# Patient Record
Sex: FEMALE | Race: ASIAN | NOT HISPANIC OR LATINO | URBAN - METROPOLITAN AREA
[De-identification: names, ages, dates, MRNs, and addresses within clinical notes are randomized per-mention and may not be internally consistent; named-entity substitution may affect disease eponyms.]

---

## 2021-08-11 ENCOUNTER — EMERGENCY (EMERGENCY)
Facility: HOSPITAL | Age: 41
LOS: 1 days | Discharge: HOME | End: 2021-08-11
Attending: EMERGENCY MEDICINE | Admitting: EMERGENCY MEDICINE
Payer: OTHER GOVERNMENT

## 2021-08-11 VITALS
SYSTOLIC BLOOD PRESSURE: 102 MMHG | OXYGEN SATURATION: 97 % | HEART RATE: 70 BPM | DIASTOLIC BLOOD PRESSURE: 69 MMHG | RESPIRATION RATE: 18 BRPM | TEMPERATURE: 98 F

## 2021-08-11 DIAGNOSIS — R51.9 HEADACHE, UNSPECIFIED: ICD-10-CM

## 2021-08-11 PROCEDURE — 99284 EMERGENCY DEPT VISIT MOD MDM: CPT

## 2021-08-11 PROCEDURE — 93010 ELECTROCARDIOGRAM REPORT: CPT

## 2021-08-11 PROCEDURE — 93005 ELECTROCARDIOGRAM TRACING: CPT

## 2021-08-11 PROCEDURE — 96374 THER/PROPH/DIAG INJ IV PUSH: CPT

## 2021-08-11 PROCEDURE — 99284 EMERGENCY DEPT VISIT MOD MDM: CPT | Mod: 25

## 2021-08-11 RX ORDER — METOCLOPRAMIDE HCL 10 MG
10 TABLET ORAL ONCE
Refills: 0 | Status: COMPLETED | OUTPATIENT
Start: 2021-08-11 | End: 2021-08-11

## 2021-08-11 RX ORDER — SODIUM CHLORIDE 9 MG/ML
1000 INJECTION INTRAMUSCULAR; INTRAVENOUS; SUBCUTANEOUS ONCE
Refills: 0 | Status: COMPLETED | OUTPATIENT
Start: 2021-08-11 | End: 2021-08-11

## 2021-08-11 RX ADMIN — SODIUM CHLORIDE 1000 MILLILITER(S): 9 INJECTION INTRAMUSCULAR; INTRAVENOUS; SUBCUTANEOUS at 02:41

## 2021-08-11 RX ADMIN — Medication 104 MILLIGRAM(S): at 02:41

## 2021-08-11 NOTE — ED PROVIDER NOTE - CARE PROVIDERS DIRECT ADDRESSES
[email or web address]

## 2021-08-11 NOTE — ED PROVIDER NOTE - PROGRESS NOTE DETAILS
Headache resolved.  Requesting DC home.  Moved to [facility] this year, will give info so patient can establish primary care and neurology follow up. Return precautions given.  Stable for DC.

## 2021-08-11 NOTE — ED PROVIDER NOTE - OBJECTIVE STATEMENT
42 y/o F pt with PMHx of migraines presents to ED c/o of headache that began around 7PM tonight. It began as a mild headache located to the R parietal that she tried to take a nap for, hoping it would feel better, but it continued to slowly worsen over the next several hours. She took Tylenol 650mg at around 10PM, but vomited it up 15min later. Her headache slightly improved since coming to ED, and she now rates it a 4 or 5 out of 10. Pt's past headache were usually global throughout her head and not unilateral, and she used to see a migraine specialist for them. Her last severe migraine was about 2yrs ago, and she reports her trigger for past migraines was usually when a darwin day became rainy later in the day, which is what happened today. Pt here tonight because she had an episode of feeling shaky all over with tingling in both her hands and feet for several minutes. Over the past several months, she's also been having brief episodes of chest pressure every few days, but only when learning forward; her last episode was 3-4 days ago. She denies fever, chills, URI symptoms, stiff neck, chest pain, palpitations, or shortness of breath.    No known hx of CAD, HTN, HLD, or DM.  Never smoker, no cocaine or other drug use.   No FHx of prematuer CAD or sudden death.   Her daughter was born with VSD. 42 y/o F pt with PMHx of migraines presents to ED c/o of headache that began around 7PM tonight. It began as a mild headache located to the R parietal that she tried to take a nap for, hoping it would feel better, but it continued to slowly worsen over the next several hours. She took Tylenol 650mg at around 10PM, but vomited it up 15min later. Her headache slightly improved since coming to ED, and she now rates it a 4 or 5 out of 10. Pt's past headache were usually global throughout her head and not unilateral, and she used to see a migraine specialist for them. Her last severe headache was about 2yrs ago, and she reports her trigger for past migraines was usually when a darwin day became rainy later in the day, which is what happened today. Pt here tonight because she had an episode of feeling shaky all over with tingling in both her hands and feet for several minutes. Over the past several months, she's also been having brief episodes of chest pressure every few days, but only when learning forward; her last episode was 3-4 days ago. Says the pain felt like it was "on the surface" of her chest.  She denies fever, chills, URI symptoms, stiff neck, chest pain, palpitations, cough, hemoptysis, or shortness of breath.    No known hx of CAD, HTN, HLD, or DM.  Never smoker, no cocaine or other drug use.   No FHx of prematuer CAD or sudden death.   Her daughter was born with VSD. 40 y/o F pt with PMHx of migraines presents to ED c/o of headache that began around 7PM tonight. It began as a mild headache located to the R parietal that she tried to take a nap for, hoping it would feel better, but it continued to slowly worsen over the next several hours. She took Tylenol 650mg at around 10PM, but vomited it up 15min later. Her headache slightly improved since coming to ED, and she now rates it a 4 or 5 out of 10. Pt's past headache were usually global throughout her head and not unilateral, and she used to see a migraine specialist for them. Her last severe headache was about 2yrs ago, and she reports her trigger for past migraines was usually when a darwin day became rainy later in the day, which is what happened today.  She believes she had neuroimaging for the headaches when she lived in [place].  Pt here tonight because she had an episode of feeling shaky all over with tingling in both her hands and feet for several minutes. Over the past several months, she's also been having brief episodes of chest pressure every few days, but only when learning forward; her last episode was 3-4 days ago. Says the pain felt like it was "on the surface" of her chest.  She denies fever, chills, URI symptoms, stiff neck, chest pain, palpitations, cough, hemoptysis, or shortness of breath.    No known hx of CAD, HTN, HLD, or DM.  Never smoker, no cocaine or other drug use.   No FHx of prematuer CAD or sudden death.   Her daughter was born with VSD. 40 y/o F pt with PMHx of migraines presents to ED c/o of headache that began around 7PM tonight. It began as a mild nonpositional nonexertional headache located to the R parietal that she tried to take a nap for, hoping it would feel better, but it continued to slowly worsen over the next several hours. She took Tylenol 650mg at around 10PM, but vomited it up 15min later. Her headache slightly improved since coming to ED, and she now rates it a 4 or 5 out of 10. Pt's past headache were usually global throughout her head and not unilateral, and she used to see a migraine specialist for them. Her last severe headache was about 2yrs ago, and she reports her trigger for past migraines was usually when a darwin day became rainy later in the day, which is what happened today.  She believes she had neuroimaging for the headaches when she lived in [place].  Pt here tonight because she had an episode of feeling shaky all over with tingling in both her hands and feet for several minutes. Over the past several months, she's also been having brief episodes of chest pressure every few days, but only when learning forward; her last episode was 3-4 days ago. Says the pain felt like it was "on the surface" of her chest.  She denies fever, chills, URI symptoms, stiff neck, chest pain, palpitations, cough, hemoptysis, or shortness of breath.    No known hx of CAD, HTN, HLD, or DM.  Never smoker, no cocaine or other drug use.   No FHx of premature CAD or sudden death.   Her daughter was born with VSD.

## 2021-08-11 NOTE — ED PROVIDER NOTE - NSFOLLOWUPINSTRUCTIONS_ED_ALL_ED_FT
Follow up with primary care and neurology, this week if possible - call today to arrange follow up care.   Return to the Emergency Department if you have any new or worsening symptoms, or if you have any concerns.  ============================    Acute Headache    WHAT YOU NEED TO KNOW:    An acute headache is pain or discomfort that may start suddenly and get worse quickly. You may have an acute headache only when you feel stress or eat certain foods. Other acute headache pain can happen every day, and sometimes several times a day.     DISCHARGE INSTRUCTIONS:    Return to the emergency department if:   •You have severe pain.      •You have numbness or weakness on one side of your face or body.      •You have a headache that occurs after a blow to the head, a fall, or other trauma.       •You have a headache, are forgetful or confused, or have trouble speaking.      •You have a headache, stiff neck, and a fever.      Call your doctor if:   •You have a constant headache and are vomiting.      •You have a headache each day that does not get better, even after treatment.      •You have changes in your headaches, or new symptoms that occur when you have a headache.      •You have questions or concerns about your condition or care.      Medicines: You may need any of the following:   •Prescription pain medicine may be given. The medicine your healthcare provider recommends will depend on the kind of headaches you have. You will need to take prescription headache medicines as directed to prevent a problem called rebound headache. These headaches happen with regular use of pain relievers for headache disorders.      •NSAIDs, such as ibuprofen, help decrease swelling, pain, and fever. This medicine is available with or without a doctor's order. NSAIDs can cause stomach bleeding or kidney problems in certain people. If you take blood thinner medicine, always ask your healthcare provider if NSAIDs are safe for you. Always read the medicine label and follow directions.      •Acetaminophen decreases pain and fever. It is available without a doctor's order. Ask how much to take and how often to take it. Follow directions. Read the labels of all other medicines you are using to see if they also contain acetaminophen, or ask your doctor or pharmacist. Acetaminophen can cause liver damage if not taken correctly. Do not use more than 3 grams (3,000 milligrams) total of acetaminophen in one day.       •Antidepressants may be given for some kinds of headaches.       •Take your medicine as directed. Contact your healthcare provider if you think your medicine is not helping or if you have side effects. Tell him or her if you are allergic to any medicine. Keep a list of the medicines, vitamins, and herbs you take. Include the amounts, and when and why you take them. Bring the list or the pill bottles to follow-up visits. Carry your medicine list with you in case of an emergency.      Manage your symptoms:   •Apply heat or ice on the headache area. Use a heat or ice pack. For an ice pack, you can also put crushed ice in a plastic bag. Cover the pack or bag with a towel before you apply it to your skin. Ice and heat both help decrease pain, and heat also helps decrease muscle spasms. Apply heat for 20 to 30 minutes every 2 hours. Apply ice for 15 to 20 minutes every hour. Apply heat or ice for as long and for as many days as directed. You may alternate heat and ice.      •Relax your muscles. Lie down in a comfortable position and close your eyes. Relax your muscles slowly. Start at your toes and work your way up your body.      •Keep a record of your headaches. Write down when your headaches start and stop. Include your symptoms and what you were doing when the headache began. Record what you ate or drank for 24 hours before the headache started. Describe the pain and where it hurts. Keep track of what you did to treat your headache and if it worked.       Prevent an acute headache:   •Avoid anything that triggers an acute headache. Examples include exposure to chemicals, going to high altitude, or not getting enough sleep. Create a regular sleep routine. Go to sleep at the same time and wake up at the same time each day. Do not use electronic devices before bedtime. These may trigger a headache or prevent you from sleeping well.      •Do not smoke. Nicotine and other chemicals in cigarettes and cigars can trigger an acute headache or make it worse. Ask your healthcare provider for information if you currently smoke and need help to quit. E-cigarettes or smokeless tobacco still contain nicotine. Talk to your healthcare provider before you use these products.       •Limit alcohol as directed. Alcohol can trigger an acute headache or make it worse. If you have cluster headaches, do not drink alcohol during an episode. For other types of headaches, ask your healthcare provider if it is safe for you to drink alcohol. Ask how much is safe for you to drink, and how often.      •Exercise as directed. Exercise can reduce tension and help with headache pain. Aim for 30 minutes of physical activity on most days of the week. Your healthcare provider can help you create an exercise plan.      •Eat a variety of healthy foods. Healthy foods include fruits, vegetables, low-fat dairy products, lean meats, fish, whole grains, and cooked beans. Your healthcare provider or dietitian can help you create meals plans if you need to avoid foods that trigger headaches.      Follow up with your healthcare provider as directed: Bring your headache record with you when you see your healthcare provider. Write down your questions so you remember to ask them during your visits.

## 2021-08-11 NOTE — ED PROVIDER NOTE - PHYSICAL EXAMINATION
CONSTITUTIONAL: WD,WN. NAD.    SKIN: Normal color and turgor. No rash.    HEAD: NC/AT.  EYES: Conjunctiva clear. EOMI. PERRL.    ENT: Airway patent, OP without erythema, tonsillar swelling or exudate; uvula midline without swelling. Nasal mucosa clear, no rhinorrhea.   RESPIRATORY:  Breathing non-labored. No retractions or accessory muscle use.  Lungs CTA bilat.  CARDIOVASCULAR:  RRR, S1S2. No M/R/G. No cervical bruit.    GI:  Abdomen soft, nontender.    MSK: Neck supple with painless ROM.  No lower extremity edema or calf tenderness.  No joint swelling or ROM limitation.  NEURO: Alert and oriented; CN II-XII grossly intact. Speech clear. 5/5 strength in all extremities.  Good balance. Steady gait. Finger to nose normal, tandem gait normal. CONSTITUTIONAL: WD,WN. NAD.    SKIN: Normal color and turgor. No rash.    HEAD: NC/AT.  EYES: Conjunctiva clear. EOMI. PERRL.    ENT: Airway patent, OP without erythema, tonsillar swelling or exudate; uvula midline without swelling. Nasal mucosa clear, no rhinorrhea.   RESPIRATORY:  Breathing non-labored. No retractions or accessory muscle use.  Lungs CTA bilat.  CARDIOVASCULAR:  RRR, S1S2. No M/R/G. No cervical bruits.    GI:  Abdomen soft, nontender.    MSK: Neck supple with painless ROM.  No lower extremity edema or calf tenderness.  No joint swelling or ROM limitation.  NEURO: Alert and oriented; CN II-XII grossly intact. Speech clear. 5/5 strength in all extremities.  Good balance. Steady gait. Finger to nose normal, tandem gait normal.

## 2021-08-11 NOTE — ED PROVIDER NOTE - NSFOLLOWUPCLINICS_GEN_ALL_ED_FT
[facility]  Family Medicine  [address]  [place], [place] [address]  Phone: [phone]  Fax:

## 2021-08-11 NOTE — ED PROVIDER NOTE - CLINICAL SUMMARY MEDICAL DECISION MAKING FREE TEXT BOX
Headache, right parietal.  Likely a primary headache syndrome, ie migraine.  Gradual onset and not characteristic of SAH.  No fever or meningismus to suggest meningitis.  No focal neuro deficits, no neck pain, no bruit - low susp for cervical vasc dissection.   Patient concerned with possible tumor, as her father had a metastatic ca (sounds like possibly primary colon); discussed risks and benefits of CT scan, and I feel that the risks of radiation from a CT scan from a first-time headache like this outweigh the benefits.  Will treat with IV fluids, Reglan, reassess.  Chest pain, episodic for several months, last CP a few days ago, no CP currently. No assoc SOB.  Low risk for ACS/PE, will get EKG.

## 2021-08-11 NOTE — ED PROVIDER NOTE - CARE PROVIDER_API CALL
Ajith Bonilla)  Neurology; Neuromuscular Medicine  [address], [address] [place] [id number]  Phone: [phone]  Fax: [phone]  Follow Up Time:     Wiliam Esteves)  Neurology  [address], [address] [place] [address]  Phone: [phone]  Fax: [phone]  Follow Up Time:

## 2021-08-11 NOTE — ED PROVIDER NOTE - PATIENT PORTAL LINK FT
You can access the FollowMyHealth Patient Portal offered by [facility] by registering at the following website: [email or web address] By joining [email or web address]’s FollowMyHealth portal, you will also be able to view your health information using other applications (apps) compatible with our system.

## 2021-08-11 NOTE — ED PROVIDER NOTE - SHIFT CHANGE DETAILS
41F c/o ha. avss. no acute focal neuro deficits. no red flags. ucg neg. s/p ivf/reglan. no indication for labs/ct head at this time.    dispo: pending reassessment s/p meds